# Patient Record
Sex: FEMALE | Race: ASIAN | NOT HISPANIC OR LATINO | URBAN - METROPOLITAN AREA
[De-identification: names, ages, dates, MRNs, and addresses within clinical notes are randomized per-mention and may not be internally consistent; named-entity substitution may affect disease eponyms.]

---

## 2023-12-22 ENCOUNTER — OUTPATIENT (OUTPATIENT)
Dept: OUTPATIENT SERVICES | Facility: HOSPITAL | Age: 23
LOS: 1 days | Discharge: STILL ADMITTED OR ADMINISTRATIVE | End: 2023-12-22
Payer: COMMERCIAL

## 2023-12-22 PROCEDURE — 90839 PSYTX CRISIS INITIAL 60 MIN: CPT

## 2023-12-22 PROCEDURE — 99214 OFFICE O/P EST MOD 30 MIN: CPT

## 2023-12-22 PROCEDURE — 90833 PSYTX W PT W E/M 30 MIN: CPT

## 2023-12-27 DIAGNOSIS — F33.9 MAJOR DEPRESSIVE DISORDER, RECURRENT, UNSPECIFIED: ICD-10-CM

## 2025-01-27 ENCOUNTER — EMERGENCY (EMERGENCY)
Facility: HOSPITAL | Age: 25
LOS: 1 days | Discharge: HOME | End: 2025-01-27
Admitting: EMERGENCY MEDICINE
Payer: OTHER MISCELLANEOUS

## 2025-01-27 VITALS
TEMPERATURE: 98 F | OXYGEN SATURATION: 100 % | SYSTOLIC BLOOD PRESSURE: 123 MMHG | HEART RATE: 98 BPM | WEIGHT: 115.08 LBS | DIASTOLIC BLOOD PRESSURE: 83 MMHG | RESPIRATION RATE: 18 BRPM

## 2025-01-27 PROCEDURE — 99283 EMERGENCY DEPT VISIT LOW MDM: CPT

## 2025-01-27 PROCEDURE — 99053 MED SERV 10PM-8AM 24 HR FAC: CPT

## 2025-01-27 NOTE — ED ADULT NURSE NOTE - CAS EDP DISCH TYPE
Migraines are now back to episodic although the frequency is still a bit high at 3-4 times per week and is certainly better than chronic daily the duration is reduced to 3 to 4 hours without any type of rescue medication and the intensity is much less reduced.  She has had no ED or urgent care visits since last office visit    She is managed on Topamax 100 mg/day.  She denies any side effects and there is been no change in mood    For rescue medication now that she is back to episodic I have prescribed rizatriptan as needed and she can use her butalbital as backup to that if needed.    However if her headaches remain mild and she does not need to take any rescue medicine I prefer her not to do that at this point since she is still down regulating.    MRI is scheduled for tomorrow MRI was normal and all lab work was unremarkable as contributing to her headaches and migraines this was all reviewed with the patient at today's office visit as well as previously in my chart    Home

## 2025-01-27 NOTE — ED PROVIDER NOTE - PATIENT PORTAL LINK FT
You can access the FollowMyHealth Patient Portal offered by [facility] by registering at the following website: [email or web address]. By joining [employer]’s FollowMyHealth portal, you will also be able to view your health information using other applications (apps) compatible with our system.

## 2025-01-27 NOTE — ED ADULT NURSE NOTE - OBJECTIVE STATEMENT
Patient received in intake 10A, A&Ox3 ambulatory at baseline presenting to ED from [facility] s/o needlestick to left thumb. Pt accidentally stuck herself with needle after giving medication. Exposure packet filled out. Labs drawn and sent

## 2025-01-27 NOTE — ED PROVIDER NOTE - CLINICAL SUMMARY MEDICAL DECISION MAKING FREE TEXT BOX
24-year-old female with no stated past medical history presenting to the ER with needlestick injury.  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  She states that there was some blood drawn.  Patient denies numbness/tingling, weakness, pain to the area, skin changes mass, dizziness, weakness or any other concerns.  Patient reports that she is up-to-date on tetanus vaccination and that she completed the hepatitis B vaccine series.  States that patient is low risk for HIV. On exam pt is well appearing, vitals within normal, tiny puncture wound noted to left thumb finger pad, no active bleeding. Plan: complete blue employee packet in ED. Pt offered and declined PEP.

## 2025-01-27 NOTE — ED PROVIDER NOTE - OBJECTIVE STATEMENT
24-year-old female with no stated past medical history presenting to the ER with needlestick injury.  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  She states that there was some blood drawn.  Patient denies numbness/tingling, weakness, pain to the area, skin changes mass, dizziness, weakness or any other concerns.  Patient reports that she is up-to-date on tetanus vaccination and that she completed the hepatitis B vaccine series.  States that patient is low risk for HIV.

## 2025-01-27 NOTE — ED PROVIDER NOTE - NSFOLLOWUPINSTRUCTIONS_ED_ALL_ED_FT
Follow up with [facility] in 2-3 days [phone].  Clean affected area with soap and water.  Return to the ER for any persistent/worsening or new symptoms such as fever, chills, redness, pus, drainage, streaking red lines, weakness, numbness or ANY new concerning symptoms.

## 2025-01-27 NOTE — ED ADULT TRIAGE NOTE - CHIEF COMPLAINT QUOTE
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 Denies hx. Mild bleeding noted to site